# Patient Record
Sex: MALE | Race: WHITE | URBAN - METROPOLITAN AREA
[De-identification: names, ages, dates, MRNs, and addresses within clinical notes are randomized per-mention and may not be internally consistent; named-entity substitution may affect disease eponyms.]

---

## 2024-12-23 ENCOUNTER — APPOINTMENT (OUTPATIENT)
Dept: UROLOGY | Facility: CLINIC | Age: 29
End: 2024-12-23

## 2024-12-23 VITALS
WEIGHT: 237.8 LBS | HEIGHT: 70 IN | SYSTOLIC BLOOD PRESSURE: 151 MMHG | TEMPERATURE: 96.7 F | DIASTOLIC BLOOD PRESSURE: 90 MMHG | HEART RATE: 120 BPM | BODY MASS INDEX: 34.04 KG/M2

## 2024-12-23 DIAGNOSIS — F64.9 GENDER DYSPHORIA: Primary | ICD-10-CM

## 2024-12-23 PROCEDURE — 1036F TOBACCO NON-USER: CPT | Performed by: UROLOGY

## 2024-12-23 PROCEDURE — 3008F BODY MASS INDEX DOCD: CPT | Performed by: UROLOGY

## 2024-12-23 PROCEDURE — 99205 OFFICE O/P NEW HI 60 MIN: CPT | Performed by: UROLOGY

## 2024-12-23 RX ORDER — BUPROPION HYDROCHLORIDE 150 MG/1
TABLET, EXTENDED RELEASE ORAL
COMMUNITY

## 2024-12-23 RX ORDER — NICOTINE POLACRILEX 2 MG
GUM BUCCAL
COMMUNITY

## 2024-12-23 RX ORDER — BENZOYL PEROXIDE 50 MG/ML
LIQUID TOPICAL 2 TIMES DAILY
COMMUNITY
Start: 2024-02-19 | End: 2025-02-18

## 2024-12-23 RX ORDER — NEEDLES, DISPOSABLE 25GX5/8"
NEEDLE, DISPOSABLE MISCELLANEOUS
COMMUNITY
Start: 2024-12-02

## 2024-12-23 RX ORDER — SYRINGE, DISPOSABLE, 1 ML
SYRINGE, EMPTY DISPOSABLE MISCELLANEOUS
COMMUNITY
Start: 2024-12-02

## 2024-12-23 RX ORDER — DICYCLOMINE HYDROCHLORIDE 10 MG/1
10 CAPSULE ORAL
COMMUNITY
Start: 2024-05-06

## 2024-12-23 RX ORDER — PROGESTERONE 100 MG/1
100 CAPSULE ORAL DAILY
COMMUNITY

## 2024-12-23 RX ORDER — SERTRALINE HYDROCHLORIDE 50 MG/1
50 TABLET, FILM COATED ORAL
COMMUNITY

## 2024-12-23 RX ORDER — ESTRADIOL VALERATE 20 MG/ML
INJECTION INTRAMUSCULAR
COMMUNITY

## 2024-12-23 RX ORDER — SERTRALINE HYDROCHLORIDE 100 MG/1
100 TABLET, FILM COATED ORAL 2 TIMES DAILY
COMMUNITY

## 2024-12-23 ASSESSMENT — PAIN SCALES - GENERAL: PAINLEVEL_OUTOF10: 0-NO PAIN

## 2024-12-23 NOTE — PROGRESS NOTES
Urology Trans & Gender-Diverse Questionnaire    Question 12/19/2024  5:36 PM EST - Filed by Patient   1. WHAT NAME DO YOU GO BY? Brooke   2. WHAT PRONOUNS DO YOU PREFER? She/her   3. WHAT IS YOUR IDENTIFIED GENDER? Female   4. WHEN DID YOU FIRST FEEL LIKE YOU WERE IN THE WRONG BODY OR IDENTIFIED WITH ANOTHER GENDER? Always   5. AT WHAT AGE DID YOU FIRST EXPERIENCE DYSPHORIA? Unsure   6. WHAT PART OF YOUR BODY GIVES YOU THE MOST DYSPHORIA? (PENIS - TESTICLES - VAGINA - BREASTS - CHEST - JAWLINE - RADHA’S APPLE - VOICE,OTHER) Facial hair, voice, penis   7. WHEN DID YOU SOCIALLY TRANSITION? IN OTHER WORDS, WHEN DID YOU START CONSISTENTLY PRESENTING TO YOUR FRIENDS AND/OR FAMILY IN YOUR IDENTIFIED GENDER? Late 2020   8. WHEN DID YOU HORMONALLY TRANSITION? IN OTHER WORDS, WHEN DID YOU START TAKING GENDER AFFIRMING HORMONE THERAPY SUCH AS TESTOSTERONE OR ESTROGEN? January 2021   9. DID YOU EVER TAKE PUBERTY BLOCKERS TO DELAY OR PREVENT THE ONSET OF PUBERTY? No   10. WHO IS YOUR CURRENT HORMONE PROVIDER? [facility]   11. DO YOU HAVE AN ESTABLISHED RELATIONSHIP WITH A THERAPIST AND/OR OTHER MENTAL HEALTH PROVIDER? IF SO, PLEASE PROVIDE THEIR NAME. Yes, Dr Akosua Aguirre   12. PLEASE LIST ANY HISTORY OF MENTAL HEALTH CHALLENGES (NONE-ANXIETY - DEPRESSION - BIPOLAR-SUICIDAL THOUGHTS-SUICIDAL ATTEMPT-DISSOCIATIVE DISORDER-SCHIZOPHRENIA-OTHER) Anxiety, depression, suicidal thoughts   13. WHO ARE YOU SEXUALLY ATTRACTED TO? Pansexual   14. DO YOU CURRENTLY HAVE A PARTNER(S)? Yes   15. WHAT DO YOU HOPE TO ACHIEVE FROM GENDER AFFIRMING BOTTOM SURGERY? (ADDRESS DYSPHORIA - SEXUAL FUNCTION - RECEPTIVE VAGINAL INTERCOURSE -PENETRATIVE INTERCOURSE - STANDING TO PEE, OTHER) Address dysphoria, receptive vaginal intercourse   16. ANY PREVIOUS GENDER AFFIRMING SURGERIES? IF SO,PLEASE SPECIFY. Orchiectomy   17. ANY OTHER MEDICAL HISTORY? (NONE - DIABETES - HIGH BLOOD PRESSURE - HEART DISEASE OTHER) None   18. ANY NICOTINE USE IN  ANY FORM? No   19. ANY MARIJUANA/THC/CBD USE IN ANY FORM? No   20. ANY OTHER RECREATIONAL DRUG USE? No   21. ANY ALCOHOL CONSUMPTION? Rarely   22. HAIR REMOVAL - WHERE ON YOUR BODY, HOW (LASER OR ELECTROLYSIS, ETC.), HOW MANY SESSIONS? Laser and electrolysis, face, very few   23. DO YOU HAVE ANY CHILDREN? No   24. ARE YOU INTERESTED IN FERTILITY PRESERVATION? N/A   Prior orchiectomy      Subjective:  - Requesting sex reassignment surgery (SRS), specifically peritoneal pull-through (PPT) vaginoplasty  - On hormones since 07/01/2020 with positive changes and feeling the best ever about self  - Socially transitioned in late 2019, approximately 4 months before starting hormones  - Came out to parents around the time of starting hormones  - Father unsupportive, no contact for 3 years  - Mother is [employer], converted around 9/11, mixed support - helped with finding dresses and bras, generally avoids pronouns but uses proper ones when needed  - Had orchiectomy 1-2 years ago  - Wants surgery sooner rather than later due to concerns about rights being taken away  - Aware of approximately 6-month recovery to get back to normal, up to a year for full sensation to redevelop    Past Medical History:  - Orchiectomy 1-2 years ago  - Partner was admitted to mental health [facility] for a breakdown on one occasion  - Partner was hospitalised for gallbladder issues on another occasion  - Currently taking sertraline, bupropion, biotin, and vitamin D3 supplements      PE:    Looks well  Slightly overweight  Nl gait  Slow affect    Objective:  - Discussed peritoneal pull-through (PPT) vaginoplasty procedure, surgical process, recovery, and potential complications:  - Surgery duration 4-6 hours  - Hospital stay 5-6 days post-operatively  - Wound care and dilation taught post-operatively  - First 2 months most intensive recovery period  - Up to 6 months for full recovery, sensation may take longer  - Importance of mental health support and  therapy discussed  - 50% chance of minor complications like wound separation or delayed healing  - 25% may require minor revision surgery at 1 year post-op  - 10% may require more involved revision of the vaginal canal  - No smoking or tobacco use  - Currently taking sertraline, bupropion, biotin, and vitamin D3 supplements - advised to continue    Assessment & Plan:  1. Gender dysphoria  - Requesting sex reassignment surgery, specifically peritoneal pull-through vaginoplasty  - Discussed surgical process, recovery, potential complications, and importance of mental health support  - Advised to continue current medications  - Plan to proceed with surgery once letters of support obtained, approximately 6-7 months, earlier if cancellation  - Follow-up in person 6-8 weeks prior to surgery to discuss procedure, logistics, supplies, and post-operative care in greater detail    2. Obtaining letters of support for surgery  - Working with therapist on letter, nearly complete  - Needs to discuss with  at [facility] regarding their letter  - Offered option to see in-house psychologist for letter if needed